# Patient Record
Sex: FEMALE | Race: WHITE | Employment: OTHER | URBAN - METROPOLITAN AREA
[De-identification: names, ages, dates, MRNs, and addresses within clinical notes are randomized per-mention and may not be internally consistent; named-entity substitution may affect disease eponyms.]

---

## 2017-07-07 ENCOUNTER — HOSPITAL ENCOUNTER (OUTPATIENT)
Dept: PHYSICAL THERAPY | Age: 82
Discharge: HOME | End: 2017-07-07
Payer: MEDICARE

## 2017-07-07 PROCEDURE — G8979 MOBILITY GOAL STATUS: HCPCS

## 2017-07-07 PROCEDURE — 97110 THERAPEUTIC EXERCISES: CPT

## 2017-07-07 PROCEDURE — 97162 PT EVAL MOD COMPLEX 30 MIN: CPT

## 2017-07-07 PROCEDURE — G8978 MOBILITY CURRENT STATUS: HCPCS

## 2017-07-07 NOTE — PROGRESS NOTES
[place] [address]  [facility] [place] [facility] AT [facility]  [address], [place]. [address], [place], [address] [facility] [address]   phone: [phone], fax: [phone]     PLAN OF [place]   Patient Name: Mis Kyle : 1922   Medical   Diagnosis: LBP, L hip pain Treatment Diagnosis: Low back pain [M54.5]  Left hip pain [M25.552]   Onset Date: ongoing     Referral Source: Litzy Crane MD Start of Care [facility]): 2017   Prior Hospitalization: See medical history Provider #: [id number]   Prior Level of Function: Ongoing pain with ADLs/ amb   Comorbidities: Fibromyalgia, osteoporosis, arthritis, HTN, scoliosis   Medications: Verified on Patient Summary List   The Plan of Care and following information is based on the information from the initial evaluation.   ======================================================================  Assessment / key information: Mis Kyle is a 80 y.o.  yo female with complaints of  L LBP and L hip pain which radiates down the left leg to the knee. Pain has been present for 10+ years. Pain is worse with prolonged sitting, prolonged standing/ walking, richard. when turning. The pain can be sharp in nature. On assessment, Mis Kyle ambulates into the clinic using a rollator for ambulation. She has limited standing tolerance due to increased back pain. She stands with flexed posture. S- shaped scoliosis is present with R ASIS elevated. She demonstrates Lx ROM as floows: flex= WFL, ext= significant limitation, . LE strength and ROM are WNLs except significant limitation of bilat hip IR and tight hip flexors. Repeated movement tests suggests directional preference for flex .    FOTO score= 34%.  ======================================================================  Eval Complexity: History HIGH Complexity :3+ comorbidities / personal factors will impact the outcome/ POC ;  Examination  MEDIUM Complexity : 3 Standardized tests and measures addressing body structure, function, activity limitation and / or participation in recreation ; Presentation MEDIUM Complexity : Evolving with changing characteristics ; Decision Making MEDIUM Complexity : FOTO score of 26-74; Overall Complexity MEDIUM  Problem List: pain affecting function, decrease ROM, decrease strength, impaired gait/ balance, decrease ADL/ functional abilitiies, decrease activity tolerance and decrease flexibility/ joint mobility   Treatment Plan may include any combination of the following: Therapeutic exercise, Therapeutic activities, Neuromuscular re-education, Physical agent/modality, Gait/balance training, Manual therapy and Patient education  Patient / Family readiness to learn indicated by: asking questions, trying to perform skills and interest  Persons(s) to be included in education: patient (P)  Barriers to Learning/Limitations: None  Measures taken:    Patient Goal (s): Walk without sudden pain   Patient self reported health status: good  Rehabilitation Potential: fair   Short Term Goals: To be accomplished in  2  weeks:  1 Patient will report >= 25% improvement in symptoms with ADLs. 2 Patient will be educated in appropriate ROM, stabilization, strengthening exercises; pt to report compliance. Felicia Salas Long Term Goals: To be accomplished in  4-6  weeks:  1 Patient to report >= 70% improvement in symptoms with ADLs. .  2 Patient will be independent with finalized HEP/ self maintenance. 3 Increase FOTO score >=  48% to indicate improved function. 4 Fully abolish radicular symptoms > 1 week duration. 5 Patient to report ability to stand > 5 minutes without increased pain    Frequency / Duration:   Patient to be seen  2  times per week for 4-6  weeks:  Patient / Caregiver education and instruction: self care, activity modification and exercises  G-Codes (GP): Mobility [id number] Current  CL= 60-79%   Goal  CJ= 20-39%.   The severity rating is based on the FOTO Score  Therapist Signature: Briseida Zhang PT Date: 3/5/4745   Certification Period: 7/7/17 to 10/3/17 Time: 12:48 PM   =======================================================================  I certify that the above Physical Therapy Services are being furnished while the patient is under my care. I agree with the treatment plan and certify that this therapy is necessary. Physician Signature:        Date:       Time:     Please sign and return to [facility] or you may fax the signed copy to [phone]. Thank you.

## 2017-07-07 NOTE — PROGRESS NOTES
PHYSICAL THERAPY - DAILY TREATMENT NOTE    Patient Name: Marya Duff        Date: 2017  : 1922    Patient  Verified: yes  Visit #:   1     Insurance: Payor: Dionna Reyes / Plan: [place] MEDICARE PART [place] & B / Product Type: Medicare /      In time: 930 Out time: 6947   Total Treatment Time: 55     Medicare Time Tracking (below)   Total Timed Codes (min):  15 1:1 Treatment Time:  55     TREATMENT AREA =  Low back pain [M54.5]  Left hip pain [M25.552]    SUBJECTIVE  Pain Level (on 0 to 10 scale):  5  / 10   Medication Changes/New allergies or changes in medical history, any new surgeries or procedures?     NO    If yes, update Summary List   Subjective Functional Status/Changes:  []  No changes reported     See EVAL/ POC         OBJECTIVE  Modalities Rationale: to decrease pain, edema, neural compromise in order to perform ADLs/ rest with improved ease        min [] Estim, type/location:                                      []  att     []  unatt     []  w/US     []  w/ice    []  w/heat    min []  Mechanical Traction: type/lbs                   []  pro   []  sup   []  int   []  cont    []  before manual    []  after manual    min []  Ultrasound, settings/location:      min []  Iontophoresis w/ dexamethasone, location:                                               []  take home patch       []  in clinic    min []  Ice     []  Heat    location/position:     min []  Vasopneumatic Device, press/temp:     min []  Other:    [] Skin assessment post-treatment (if applicable):    []  intact    []  redness- no adverse reaction     []redness  adverse reaction:        15 min Therapeutic Exercise:  [x]  See flow sheet   Rationale:      increase ROM and increase strength to improve the patients ability to perform ADLs      min Manual Therapy:    Rationale:      decrease pain, increase ROM, increase tissue extensibility and decrease trigger points to improve patient's ability to perform ADLs     min Neuromuscular Re-ed:    Rationale:    improve coordination, improve balance, increase proprioception and improved posture, improve motor control to improve the patients ability to perform ADLs     min Gait Training:    Rationale:       min Patient Education:  YES  Reviewed HEP   []  Progressed/Changed HEP based on:         Other Objective/Functional Measures:    See EVAL/ POC     Post Treatment Pain Level (on 0 to 10) scale:   5  / 10     ASSESSMENT      [x]  See POC     PLAN  [x]  Upgrade activities as tolerated  Continue plan of care: yes   []  Discharge due to :    []  Other:      Therapist: Troy Frazier PT    Date: 7/7/2017 Time: 12:47 PM     Future Appointments  Date Time Provider Felisa Samuel   7/12/2017 10:00 AM Troy Frazier, PT [facility]   7/19/2017 10:00 AM Troy Frazier, PT [facility]   7/21/2017 11:30 AM Troy Frazier, PT [facility]   7/26/2017 10:00 AM Troy Frazier, PT [facility]   7/28/2017 10:00 AM Troy Frazier, PT [facility]   8/2/2017 10:00 AM Troy Frazier, PT [facility]   8/4/2017 10:00 AM Troy Frazier, PT [facility]   8/9/2017 10:00 AM Troy Frazier, PT [facility]   8/16/2017 10:00 AM Troy Frazier, PT [facility]   8/18/2017 10:00 AM Troy Frazier, PT [facility]

## 2017-07-12 ENCOUNTER — HOSPITAL ENCOUNTER (OUTPATIENT)
Dept: PHYSICAL THERAPY | Age: 82
Discharge: HOME | End: 2017-07-12
Payer: MEDICARE

## 2017-07-12 PROCEDURE — 97110 THERAPEUTIC EXERCISES: CPT

## 2017-07-12 PROCEDURE — 97140 MANUAL THERAPY 1/> REGIONS: CPT

## 2017-07-19 ENCOUNTER — HOSPITAL ENCOUNTER (OUTPATIENT)
Dept: PHYSICAL THERAPY | Age: 82
Discharge: HOME | End: 2017-07-19
Payer: MEDICARE

## 2017-07-19 PROCEDURE — 97110 THERAPEUTIC EXERCISES: CPT

## 2017-07-19 PROCEDURE — 97140 MANUAL THERAPY 1/> REGIONS: CPT

## 2017-07-19 NOTE — PROGRESS NOTES
PHYSICAL THERAPY - DAILY TREATMENT NOTE    Patient Name: Zac Owens        Date: 2017  : 1922   YES Patient  Verified  Visit #:   3     Insurance: Payor: Sanjay Isabel / Plan: [place] MEDICARE PART A & B / Product Type: Medicare /      In time: 1000 Out time: 1542   Total Treatment Time: 45     Medicare Time Tracking (below)   Total Timed Codes (min):  35 1:1 Treatment Time:  35     TREATMENT AREA = Low back pain [M54.5]  Left hip pain [M25.552]    SUBJECTIVE  Pain Level (on 0 to 10 scale):  0  / 10   Medication Changes/New allergies or changes in medical history, any new surgeries or procedures?     NO    If yes, update Summary List   Subjective Functional Status/Changes:  []  No changes reported     Pain varies   Pain hits when I stand and go to turn     OBJECTIVE    20 min Therapeutic Exercise:  [x]  See flow sheet   Rationale:      increase ROM, increase strength and dec neural compromise to improve the patients ability to perform ADLs with less pain     15 min Manual Therapy: Technique:      [x] STM[]IASTM[x]TPR[]PROM[] Stretching  [] SOR[] man traction[]   []OP with REIL  []Jt manipulation []Gr I [] II []  III [] IV[] V[]    Treatment Area:  L QL, LB   Rationale:      decrease pain, increase ROM, increase tissue extensibility and decrease trigger points to improve patient's ability to perform ADLs with less pain      Modalities Rationale:     decrease pain and increase tissue extensibility to improve patient's ability to relax   min [] Estim, type/location:                                      []  att     []  unatt     []  w/US     []  w/ice    []  w/heat    min []  Mechanical Traction: type/lbs                   []  pro   []  sup   []  int   []  cont    []  before manual    []  after manual    min []  Ultrasound, settings/location:      min []  Iontophoresis w/ dexamethasone, location:                                               []  take home patch       []  in clinic   10 min [] Ice     [x]  Heat    location/position:     min []  Vasopneumatic Device, press/temp:     min []  Other:    [x] Skin assessment post-treatment (if applicable):    [x]  intact    [x]  redness- no adverse reaction     []redness  adverse reaction:         min Gait Training:    Rationale:        throughout Rx min Patient Education:  YES  Reviewed HEP   []  Progressed/Changed HEP based on: Other Objective/Functional Measures:    Tender L QL- glut region     Post Treatment Pain Level (on 0 to 10) scale:   4 / 10     ASSESSMENT  Assessment/Changes in Function:     Min change     []  See Progress Note/Recertification   Patient will continue to benefit from skilled PT services to modify and progress therapeutic interventions, address functional mobility deficits, address ROM deficits, address strength deficits, analyze and address soft tissue restrictions, analyze and cue movement patterns and assess and modify postural abnormalities to attain remaining goals.    Progress toward goals / Updated goals:    Progressed HEP     PLAN  [x]  Upgrade activities as tolerated YES Continue plan of care   []  Discharge due to :    []  Other:      Therapist: Swapna Sung PT    Date: 7/19/2017 Time: 10:12 AM     Future Appointments  Date Time Provider Felisa Samuel   7/21/2017 11:30 AM Swapna Sung PT [facility]   7/26/2017 10:00 AM Swapna Sung, PT [facility]   7/28/2017 10:00 AM Swapna Sung, PT [facility]   8/2/2017 10:00 AM Swapna Sung PT [facility]   8/4/2017 10:00 AM Swapna Sung PT [facility]   8/9/2017 10:00 AM Swapna Sung PT [facility]   8/16/2017 10:00 AM Swapna Sung, PT [facility]   8/18/2017 10:00 AM Swapna Sung, PT [facility]

## 2017-07-21 ENCOUNTER — HOSPITAL ENCOUNTER (OUTPATIENT)
Dept: PHYSICAL THERAPY | Age: 82
Discharge: HOME | End: 2017-07-21
Payer: MEDICARE

## 2017-07-21 PROCEDURE — 97140 MANUAL THERAPY 1/> REGIONS: CPT

## 2017-07-21 NOTE — PROGRESS NOTES
PHYSICAL THERAPY - DAILY TREATMENT NOTE    Patient Name: Emeka Andrews        Date: 2017  : 1922   YES Patient  Verified  Visit #:   4     Insurance: Payor: Charmaine Walter / Plan: [place] MEDICARE PART A & B / Product Type: Medicare /      In time: 569 Out time:    Total Treatment Time: 40     Medicare Time Tracking (below)   Total Timed Codes (min):  30 1:1 Treatment Time:  15     TREATMENT AREA = Low back pain [M54.5]  Left hip pain [M25.552]    SUBJECTIVE  Pain Level (on 0 to 10 scale):  5  / 10   Medication Changes/New allergies or changes in medical history, any new surgeries or procedures? NO    If yes, update Summary List   Subjective Functional Status/Changes:  []  No changes reported     Significant pain yesterday.   Did too much walking around to fix lunch        OBJECTIVE    15, nc min Therapeutic Exercise:  [x]  See flow sheet   Rationale:      increase ROM and inc stability to improve the patients ability to perform ADLs with less pain     15 min Manual Therapy: Technique:      [x] STM[]IASTM[x]TPR[]PROM[] Stretching  [] SOR[] man traction[]   []OP with REIL  []Jt manipulation []Gr I [] II []  III [] IV[] V[]    Treatment Area:  L LB/ glut region   Rationale:      decrease pain, increase ROM, increase tissue extensibility and decrease trigger points to improve patient's ability to perform ADLs with less pain      Modalities Rationale:     decrease pain and increase tissue extensibility to improve patient's ability to relax   min [] Estim, type/location:                                      []  att     []  unatt     []  w/US     []  w/ice    []  w/heat    min []  Mechanical Traction: type/lbs                   []  pro   []  sup   []  int   []  cont    []  before manual    []  after manual    min []  Ultrasound, settings/location:      min []  Iontophoresis w/ dexamethasone, location:                                               []  take home patch       []  in clinic   10 min []  Ice     [x]  Heat    location/position:     min []  Vasopneumatic Device, press/temp:     min []  Other:    [x] Skin assessment post-treatment (if applicable):    [x]  intact    []  redness- no adverse reaction     []redness  adverse reaction:         min Gait Training:    Rationale:        throughout Rx min Patient Education:  YES  Reviewed HEP   []  Progressed/Changed HEP based on: Other Objective/Functional Measures:    Tender L QL- glut. Increased tone     Post Treatment Pain Level (on 0 to 10) scale:   0  / 10     ASSESSMENT  Assessment/Changes in Function:     Min change reportsed     []  See Progress Note/Recertification   Patient will continue to benefit from skilled PT services to modify and progress therapeutic interventions, address functional mobility deficits, address ROM deficits, address strength deficits, analyze and address soft tissue restrictions, analyze and cue movement patterns and assess and modify postural abnormalities to attain remaining goals.    Progress toward goals / Updated goals:    Slow progress     PLAN  [x]  Upgrade activities as tolerated YES Continue plan of care   []  Discharge due to :    []  Other:      Therapist: Gabriela Dewitt PT    Date: 7/21/2017 Time: 9:56 AM     Future Appointments  Date Time Provider Felisa Samuel   7/21/2017 11:30 AM Gabriela Dewitt PT [facility]   7/26/2017 10:00 AM Gabriela Dewitt PT [address] [place] [address]   7/28/2017 10:00 AM Gabriela Dewitt PT [facility]   8/2/2017 10:00 AM Gabriela Dewitt PT [facility]   8/4/2017 10:00 AM Gabriela Dewitt PT [facility]   8/9/2017 10:00 AM Gabriela Dewitt PT [facility]   8/16/2017 10:00 AM Gabriela Dewitt PT [facility]   8/18/2017 10:00 AM Gabriela Dewitt PT [facility]

## 2017-07-26 ENCOUNTER — HOSPITAL ENCOUNTER (OUTPATIENT)
Dept: PHYSICAL THERAPY | Age: 82
Discharge: HOME | End: 2017-07-26
Payer: MEDICARE

## 2017-07-26 PROCEDURE — 97140 MANUAL THERAPY 1/> REGIONS: CPT

## 2017-07-26 PROCEDURE — 97110 THERAPEUTIC EXERCISES: CPT

## 2017-07-26 NOTE — PROGRESS NOTES
PHYSICAL THERAPY - DAILY TREATMENT NOTE    Patient Name: Ryan Patel        Date: 2017  : 1922   YES Patient  Verified  Visit #:     Insurance: Payor: Tony Rajput / Plan: [place] MEDICARE PART A & B / Product Type: Medicare /      In time: 6364 Out time:    Total Treatment Time: 40     Medicare Time Tracking (below)   Total Timed Codes (min):  30 1:1 Treatment Time:  30     TREATMENT AREA = Low back pain [M54.5]  Left hip pain [M25.552]    SUBJECTIVE  Pain Level (on 0 to 10 scale):  4  / 10   Medication Changes/New allergies or changes in medical history, any new surgeries or procedures? NO    If yes, update Summary List   Subjective Functional Status/Changes:  []  No changes reported     Woke in a lot of pain- better now.   Mornings are always bad    Overall 50% better        OBJECTIVE    15 min Therapeutic Exercise:  [x]  See flow sheet   Rationale:      increase ROM and increase strength to improve the patients ability to amb with less pain     15 min Manual Therapy: Technique:      [x] STM[]IASTM[x]TPR[]PROM[] Stretching  [] SOR[] man traction[]   []OP with REIL  []Jt manipulation []Gr I [] II []  III [] IV[] V[]    Treatment Area:  LB   Rationale:      decrease pain, increase tissue extensibility and decrease trigger points to improve patient's ability to amb with less pain      Modalities Rationale:     decrease pain and increase tissue extensibility to improve patient's ability to amb with less pain   min [] Estim, type/location:                                      []  att     []  unatt     []  w/US     []  w/ice    []  w/heat    min []  Mechanical Traction: type/lbs                   []  pro   []  sup   []  int   []  cont    []  before manual    []  after manual    min []  Ultrasound, settings/location:      min []  Iontophoresis w/ dexamethasone, location:                                               []  take home patch       []  in clinic   10 min []  Ice     [x]  Heat location/position:     min []  Vasopneumatic Device, press/temp:     min []  Other:    [x] Skin assessment post-treatment (if applicable):    [x]  intact    [x]  redness- no adverse reaction     []redness  adverse reaction:         min Gait Training:    Rationale:        throughout Rx min Patient Education:  YES  Reviewed HEP   []  Progressed/Changed HEP based on: Other Objective/Functional Measures    :improved bed mobility--less guarded         Post Treatment Pain Level (on 0 to 10) scale:   0  / 10     ASSESSMENT  Assessment/Changes in Function:     Functional improvement:  Improved katrina, mobility with bed transfers   Functional limitation:  pain      []  See Progress Note/Recertification   Patient will continue to benefit from skilled PT services to modify and progress therapeutic interventions, address functional mobility deficits, address ROM deficits, address strength deficits, analyze and address soft tissue restrictions, analyze and cue movement patterns and assess and modify postural abnormalities to attain remaining goals.    Progress toward goals / Updated goals:    Met LTG 1     PLAN  [x]  Upgrade activities as tolerated YES Continue plan of care   []  Discharge due to :    []  Other:      Therapist: Olga Conklin PT    Date: 7/26/2017 Time: 10:09 AM     Future Appointments  Date Time Provider Felisa Samuel   7/28/2017 10:00 AM Olga Conklin PT [facility]   8/2/2017 10:00 AM Olga Conklin PT [facility]   8/4/2017 10:00 AM Olga Conklin PT [facility]   8/9/2017 10:00 AM Olga Conklin PT [facility]   8/16/2017 10:00 AM Olga Conklin PT [facility]   8/18/2017 10:00 AM Olga Conklin PT [facility]

## 2017-07-28 ENCOUNTER — HOSPITAL ENCOUNTER (OUTPATIENT)
Dept: PHYSICAL THERAPY | Age: 82
Discharge: HOME | End: 2017-07-28
Payer: MEDICARE

## 2017-07-28 PROCEDURE — 97110 THERAPEUTIC EXERCISES: CPT

## 2017-07-28 PROCEDURE — 97140 MANUAL THERAPY 1/> REGIONS: CPT

## 2017-07-28 NOTE — PROGRESS NOTES
PHYSICAL THERAPY - DAILY TREATMENT NOTE    Patient Name: Erica Finn        Date: 2017  : 1922   YES Patient  Verified  Visit #:     Insurance: Payor: Jacob Art / Plan: [place] MEDICARE PART A & B / Product Type: Medicare /      In time:  Out time:    Total Treatment Time: 45     Medicare Time Tracking (below)   Total Timed Codes (min):  30 1:1 Treatment Time:  30     TREATMENT AREA = Low back pain [M54.5]  Left hip pain [M25.552]    SUBJECTIVE  Pain Level (on 0 to 10 scale):  4  / 10   Medication Changes/New allergies or changes in medical history, any new surgeries or procedures? NO    If yes, update Summary List   Subjective Functional Status/Changes:  []  No changes reported     Better, but pain is still there.   Less pain with walking   Managing pain without Tylenol     OBJECTIVE    15 min Therapeutic Exercise:  [x]  See flow sheet   Rationale:      increase ROM and increase strength to improve the patients ability to amb with less pain     15 min Manual Therapy: Technique:      [x] STM[]IASTM[x]TPR[]PROM[] Stretching  [] SOR[] man traction[]   []OP with REIL  []Jt manipulation []Gr I [] II []  III [] IV[] V[]    Treatment Area:  LB   Rationale:      decrease pain, increase ROM, increase tissue extensibility and decrease trigger points to improve patient's ability to amb/ perform ADLs with less pain        Modalities Rationale:     decrease pain and increase tissue extensibility to improve patient's ability to perform ADLs/ amb with less pain   min [] Estim, type/location:                                      []  att     []  unatt     []  w/US     []  w/ice    []  w/heat    min []  Mechanical Traction: type/lbs                   []  pro   []  sup   []  int   []  cont    []  before manual    []  after manual    min []  Ultrasound, settings/location:      min []  Iontophoresis w/ dexamethasone, location:                                               []  take home patch []  in clinic   10 min []  Ice     [x]  Heat    location/position:     min []  Vasopneumatic Device, press/temp:     min []  Other:    [x] Skin assessment post-treatment (if applicable):    [x]  intact    [x]  redness- no adverse reaction     []redness  adverse reaction:         min Gait Training:    Rationale:        throughout Rx min Patient Education:  YES  Reviewed HEP   []  Progressed/Changed HEP based on: Other Objective/Functional Measures:    Improved mobility with amb and with transitions on treatment table     Post Treatment Pain Level (on 0 to 10) scale:   0  / 10     ASSESSMENT  Assessment/Changes in Function:     Functional improvement:  Improved mobility with transfers   Functional limitation:  Cont pain with amb      []  See Progress Note/Recertification   Patient will continue to benefit from skilled PT services to modify and progress therapeutic interventions, address functional mobility deficits, address ROM deficits, address strength deficits, analyze and address soft tissue restrictions and analyze and cue movement patterns to attain remaining goals.    Progress toward goals / Updated goals:    Dec pain level as reported by pt      reported PLAN  [x]  Upgrade activities as tolerated YES Continue plan of care   []  Discharge due to :    []  Other:      Therapist: Gabriela Dewitt PT    Date: 7/28/2017 Time: 10:08 AM     Future Appointments  Date Time Provider Felisa Samuel   8/2/2017 10:00 AM Gabriela Dewitt PT [facility]   8/4/2017 10:00 AM Gabriela Dewitt PT [facility]   8/9/2017 10:00 AM Gabriela Dewitt PT [facility]   8/16/2017 10:00 AM Gabriela Dewitt PT [facility]   8/18/2017 10:00 AM Gabriela Dewitt PT [facility]

## 2017-08-02 ENCOUNTER — HOSPITAL ENCOUNTER (OUTPATIENT)
Dept: PHYSICAL THERAPY | Age: 82
Discharge: HOME | End: 2017-08-02
Payer: MEDICARE

## 2017-08-02 PROCEDURE — 97110 THERAPEUTIC EXERCISES: CPT

## 2017-08-02 PROCEDURE — 97140 MANUAL THERAPY 1/> REGIONS: CPT

## 2017-08-02 NOTE — PROGRESS NOTES
PHYSICAL THERAPY - DAILY TREATMENT NOTE    Patient Name: Ryan Patel        Date: 2017  : 1922   YES Patient  Verified  Visit #:     Insurance: Payor: Tony Rajput / Plan: [place] MEDICARE PART A & B / Product Type: Medicare /      In time: 1000 Out time:    Total Treatment Time: 45     Medicare Time Tracking (below)   Total Timed Codes (min): 35 1:1 Treatment Time:  35     TREATMENT AREA = Low back pain [M54.5]  Left hip pain [M25.552]    SUBJECTIVE  Pain Level (on 0 to 10 scale):  0  / 10   Medication Changes/New allergies or changes in medical history, any new surgeries or procedures?     NO    If yes, update Summary List   Subjective Functional Status/Changes:  []  No changes reported     Not much pain after I get up and moving, But sign pain getting up on the morning        OBJECTIVE    20 min Therapeutic Exercise:  [x]  See flow sheet   Rationale:      increase ROM and increase strength to improve the patients ability to amb and transition with less pain     15 min Manual Therapy: Technique:      [x] STM[]IASTM[x]TPR[]PROM[x] Stretching  [] SOR[] man traction[]   []OP with REIL  []Jt manipulation []Gr I [] II []  III [] IV[] V[]    Treatment Area:  LB   Rationale:      decrease pain, increase ROM, increase tissue extensibility and decrease trigger points to improve patient's ability to amb/ transition with less pain        Modalities Rationale:     decrease pain and increase tissue extensibility to improve patient's ability to relax muscles   min [] Estim, type/location:                                      []  att     []  unatt     []  w/US     []  w/ice    []  w/heat    min []  Mechanical Traction: type/lbs                   []  pro   []  sup   []  int   []  cont    []  before manual    []  after manual    min []  Ultrasound, settings/location:      min []  Iontophoresis w/ dexamethasone, location:                                               []  take home patch       []  in clinic   10 min []  Ice     [x]  Heat    location/position:     min []  Vasopneumatic Device, press/temp:     min []  Other:    [x] Skin assessment post-treatment (if applicable):    [x]  intact    [x]  redness- no adverse reaction     []redness  adverse reaction:          throughout Rx min Patient Education:  YES  Reviewed HEP   []  Progressed/Changed HEP based on: Other Objective/Functional Measures:    Improved mobility with ambulation--less guarded     Post Treatment Pain Level (on 0 to 10) scale:   0  / 10     ASSESSMENT  Assessment/Changes in Function:     Functional improvement:  Improved amb   Functional limitation:  Pain getting up in the morning      []  See Progress Note/Recertification   Patient will continue to benefit from skilled PT services to modify and progress therapeutic interventions, address functional mobility deficits, address ROM deficits, address strength deficits, analyze and address soft tissue restrictions and assess and modify postural abnormalities to attain remaining goals.    Progress toward goals / Updated goals:    Decreased pain intensity     PLAN  [x]  Upgrade activities as tolerated YES Continue plan of care   []  Discharge due to :    []  Other:      Therapist: Velma Martinez PT    Date: 8/2/2017 Time: 10:02 AM     Future Appointments  Date Time Provider Felisa Samuel   8/4/2017 10:00 AM Velma Martinez PT [facility]   8/9/2017 10:00 AM Velma Martinez PT [facility]   8/16/2017 10:00 AM Velma Martinez PT [facility]   8/18/2017 10:00 AM Velma Martinez, PT [facility]

## 2017-08-04 ENCOUNTER — APPOINTMENT (OUTPATIENT)
Dept: PHYSICAL THERAPY | Age: 82
End: 2017-08-04
Payer: MEDICARE

## 2017-08-09 ENCOUNTER — APPOINTMENT (OUTPATIENT)
Dept: PHYSICAL THERAPY | Age: 82
End: 2017-08-09
Payer: MEDICARE

## 2017-08-16 ENCOUNTER — HOSPITAL ENCOUNTER (OUTPATIENT)
Dept: PHYSICAL THERAPY | Age: 82
Discharge: HOME | End: 2017-08-16
Payer: MEDICARE

## 2017-08-16 PROCEDURE — 97110 THERAPEUTIC EXERCISES: CPT

## 2017-08-16 PROCEDURE — 97140 MANUAL THERAPY 1/> REGIONS: CPT

## 2017-08-16 NOTE — PROGRESS NOTES
PHYSICAL THERAPY - DAILY TREATMENT NOTE    Patient Name: Manav Lubin        Date: 2017  : 1922   YES Patient  Verified  Visit #:     nsurance: Payor: [place] MEDICARE / Plan: [place] MEDICARE PART A & B / Product Type: Medicare /      In time: 693 Out time:    Total Treatment Time: 30     Medicare Time Tracking (below)   Total Timed Codes (min):  30 1:1 Treatment Time:  30     TREATMENT AREA = Low back pain [M54.5]  Left hip pain [M25.552]    SUBJECTIVE  Pain Level (on 0 to 10 scale):  0  / 10   Medication Changes/New allergies or changes in medical history, any new surgeries or procedures? NO    If yes, update Summary List   Subjective Functional Status/Changes:  []  No changes reported     No pain now, but took meds last night. Had MELODY, but fell coming out of doctors office        OBJECTIVE    15 min Therapeutic Exercise:  [x]  See flow sheet   Rationale:      increase ROM, increase strength and improve posture to improve the patients ability to move with less pain     15 min Manual Therapy: Technique:      [x] STM[]IASTM[x]TPR[]PROM[] Stretching  [] SOR[] man traction[]   []OP with REIL  []Jt manipulation []Gr I [] II []  III [] IV[] V[]    Treatment Area:  LB   Rationale:      decrease pain, increase ROM, increase tissue extensibility and decrease trigger points to improve patient's ability to move with less pain            throughout Rx min Patient Education:  YES  Reviewed HEP   []  Progressed/Changed HEP based on:            Other Objective/Functional Measures:    amb with more upright posture     Post Treatment Pain Level (on 0 to 10) scale:   0  / 10     ASSESSMENT  Assessment/Changes in Function:     Functional improvement: intermittent pain   Functional limitation:  At risk for falls     []  See Progress Note/Recertification   Patient will continue to benefit from skilled PT services to modify and progress therapeutic interventions, address functional mobility deficits, address ROM deficits, address strength deficits, analyze and address soft tissue restrictions, analyze and cue movement patterns and assess and modify postural abnormalities to attain remaining goals.    Progress toward goals / Updated goals:    Slow progress--progressing to LTG 1     PLAN  [x]  Upgrade activities as tolerated YES Continue plan of care   []  Discharge due to :    []  Other:      Therapist: Ginny Bustillo PT    Date: 8/16/2017 Time: 10:18 AM     Future Appointments  Date Time Provider Felisa Samuel   8/18/2017 10:00 AM Ginny Bustillo PT [facility]

## 2017-08-18 ENCOUNTER — HOSPITAL ENCOUNTER (OUTPATIENT)
Dept: PHYSICAL THERAPY | Age: 82
Discharge: HOME | End: 2017-08-18
Payer: MEDICARE

## 2017-08-18 PROCEDURE — G8979 MOBILITY GOAL STATUS: HCPCS

## 2017-08-18 PROCEDURE — 97110 THERAPEUTIC EXERCISES: CPT

## 2017-08-18 PROCEDURE — G8978 MOBILITY CURRENT STATUS: HCPCS

## 2017-08-18 PROCEDURE — 97140 MANUAL THERAPY 1/> REGIONS: CPT

## 2017-08-18 NOTE — PROGRESS NOTES
[place] [address] [facility] AT [facility]  [address], [address]   phone: [phone], fax: [phone]  CONTINUED PLAN OF CARE/RECERTIFICATION FOR PHYSICAL THERAPY          Patient Name: Sarah Nagel : 1922   Treatment/Medical Diagnosis: Low back pain [M54.5]  Left hip pain [M25.552]   Onset Date: ongoing    Referral Source: Altagracia Ovalle MD Start of Care [facility]): 17   Prior Hospitalization: See Medical History Provider #: [id number]   Prior Level of Function: Ongoing pain with ADLs/ amb   Comorbidities: Fibromyalgia, osteoporosis, arthritis, HTN, scoliosis   Medications: Verified on Patient Summary List   [facility]: 9 Missed Visits: 0     Goal/Measure of Progress Goal Met? 1. Patient to report >= 70% improvement in symptoms with ADLs. .   Status at last Eval: 0 Current Status: 50% progressing   2. Patient will be independent with finalized HEP/ self maintenance   Status at last Eval: na Current Status: progressing progressing   3. Increase FOTO score >=  48% to indicate improved function   Status at last Eval: 34% Current Status: 33% no     4. Fully abolish radicular symptoms > 1 week duration   Status at last Eval: Intermittent to the knee Current Status: Intermittent to the knee no   5. Patient to report ability to stand > 5 minutes without increased pain   Status at last Eval: <5 min Current Status: 10-15 min yes     Key Functional Changes/Progress: Patient reports 50% improvement in symptoms. She states she is able to walk with less pain and for longer distance.   She reports improved standing tolerance  Problem List: pain affecting function, decrease ROM, decrease strength, impaired gait/ balance, decrease ADL/ functional abilitiies, decrease activity tolerance and decrease flexibility/ joint mobility   Treatment Plan may include any combination of the following: Therapeutic exercise, Therapeutic activities, Neuromuscular re-education, Physical agent/modality, Gait/balance training, Manual therapy and Patient education  Patient Goal(s) has been updated and includes:      Goals for this certification period include and are to be achieved in   4  weeks:  Continue LTGs as stated above  Frequency / Duration:   Patient to be seen   1   times per week for   2-4    weeks:  G-Codes (GP): Mobility [id number] Current  CL= 60-79%   Goal  CJ= 20-39%. The severity rating is based on the FOTO Score  Assessments/Recommendations: cont PT per current POC  If you have any questions/comments please contact us directly at [phone]. Thank you for allowing us to assist in the care of your patient. Therapist Signature: Mark Moran PT Date: 3/21/6458   Certification Period:  Reporting Period: 7/7/17 to 10/3/17  7/7/17 to 8/18/17 Time: 12:33 PM   NOTE TO PHYSICIAN:  PLEASE COMPLETE THE ORDERS BELOW AND FAX TO   [facility]: [phone]. If you are unable to process this request in 24 hours please contact our office: [phone].    ___ I have read the above report and request that my patient continue as recommended.   ___ I have read the above report and request that my patient continue therapy with the following changes/special instructions: ________________________________________________   ___ I have read the above report and request that my patient be discharged from therapy.      Physician Signature:        Date:

## 2017-08-18 NOTE — PROGRESS NOTES
PHYSICAL THERAPY - DAILY TREATMENT NOTE    Patient Name: Obi Dorman        Date: 2017  : 1922   YES Patient  Verified  Visit #:     Insurance: Payor: Mahogany Blair / Plan: [place] MEDICARE PART A & B / Product Type: Medicare /      In time: 420 Out time: 9792   Total Treatment Time: 40     Medicare Time Tracking (below)   Total Timed Codes (min):  40 1:1 Treatment Time:  40     TREATMENT AREA = Low back pain [M54.5]  Left hip pain [M25.552]    SUBJECTIVE  Pain Level (on 0 to 10 scale):  5-6  / 10   Medication Changes/New allergies or changes in medical history, any new surgeries or procedures? NO    If yes, update Summary List   Subjective Functional Status/Changes:  []  No changes reported     Overall-- 50% better        OBJECTIVE    25 min Therapeutic Exercise:  [x]  See flow sheet   Rationale:      increase ROM and increase strength to improve the patients ability to amb with less pain     15 min Manual Therapy: Technique:      [x] STM[]IASTM[x]TPR[]PROM[x] Stretching  [] SOR[] man traction[]   []OP with REIL  []Jt manipulation []Gr I [] II []  III [] IV[] V[]    Treatment Area:  LB/ hips   Rationale:      decrease pain, increase ROM, increase tissue extensibility and decrease trigger points to improve patient's ability to move/ amb with improved fluidity        Modalities Rationale:     Patient deferred       min Gait Training:    Rationale:        throughout Rx min Patient Education:  YES  Reviewed HEP   []  Progressed/Changed HEP based on:            Other Objective/Functional Measures:    50% improvement reported  FOTO= 33   Post Treatment Pain Level (on 0 to 10) scale:   1-2  / 10     ASSESSMENT  Assessment/Changes in Function:     Functional improvement:  Less pain with ambulation, moving in bed   Functional limitation:  Standing/ walking duration limited to 10-15 minutes      [x]  See Progress Note/Recertification     PLAN  [x]  Upgrade activities as tolerated YES Continue plan of care   []  Discharge due to :    []  Other:      Therapist: Ginny Bustillo PT    Date: 8/18/2017 Time: 9:55 AM     Future Appointments  Date Time Provider Felisa Samuel   8/18/2017 10:00 AM Ginny Bustillo PT [facility]

## 2017-08-30 ENCOUNTER — HOSPITAL ENCOUNTER (OUTPATIENT)
Dept: PHYSICAL THERAPY | Age: 82
Discharge: HOME | End: 2017-08-30
Payer: MEDICARE

## 2017-08-30 PROCEDURE — 97140 MANUAL THERAPY 1/> REGIONS: CPT

## 2017-08-30 PROCEDURE — 97110 THERAPEUTIC EXERCISES: CPT

## 2017-08-30 NOTE — PROGRESS NOTES
PHYSICAL THERAPY - DAILY TREATMENT NOTE    Patient Name: Kim Slaas        Date: 2017  : 1922   YES Patient  Verified  Visit #:   10     Insurance: Payor: Chris Boland / Plan: [place] MEDICARE PART A & B / Product Type: Medicare /      In time:  Out time:    Total Treatment Time: 50     Medicare Time Tracking (below)   Total Timed Codes (min):  45 1:1 Treatment Time:  45     TREATMENT AREA = Low back pain [M54.5]  Left hip pain [M25.552]    SUBJECTIVE  Pain Level (on 0 to 10 scale):  3  / 10   Medication Changes/New allergies or changes in medical history, any new surgeries or procedures? NO    If yes, update Summary List   Subjective Functional Status/Changes:  []  No changes reported     Doing pretty good today.   Concerned about having another MELODY--prefers PT       OBJECTIVE    30 min Therapeutic Exercise:  [x]  See flow sheet   Rationale:      increase ROM and increase strength to improve the patients ability to perform ADLs/ amb with less pain     15 min Manual Therapy: Technique:      [x] STM[]IASTM[x]TPR[]PROM[] Stretching  [] SOR[] man traction[]   []OP with REIL  []Jt manipulation []Gr I [] II []  III [] IV[] V[]    Treatment Area:  LB   Rationale:      decrease pain, increase ROM, increase tissue extensibility and decrease trigger points to improve patient's ability to perform ADLs/ amb and rest with less pain        Modalities Rationale:     decrease inflammation, decrease pain and increase tissue extensibility to improve patient's ability to amb/ move with less pain/ guarding   min [] Estim, type/location:                                      []  att     []  unatt     []  w/US     []  w/ice    []  w/heat    min []  Mechanical Traction: type/lbs                   []  pro   []  sup   []  int   []  cont    []  before manual    []  after manual    min []  Ultrasound, settings/location:      min []  Iontophoresis w/ dexamethasone, location: []  take home patch       []  in clinic   10 min []  Ice     []  Heat    location/position:     min []  Vasopneumatic Device, press/temp:     min []  Other:    [x] Skin assessment post-treatment (if applicable):    [x]  intact    [x]  redness- no adverse reaction     []redness  adverse reaction:         min Gait Training:    Rationale:        throughout Rx min Patient Education:  YES  Reviewed HEP   []  Progressed/Changed HEP based on: Other Objective/Functional Measures:    Dec overall pain intensity  Tender l piriformis     Post Treatment Pain Level (on 0 to 10) scale:   0  / 10     ASSESSMENT  Assessment/Changes in Function:     Functional improvement:  amb with improved mechanics--staying closer to RW. Improved katrina   Functional limitation:  Pain with ADLs      []  See Progress Note/Recertification   Patient will continue to benefit from skilled PT services to modify and progress therapeutic interventions, address functional mobility deficits, address ROM deficits, address strength deficits, analyze and address soft tissue restrictions, analyze and cue movement patterns and instruct in home and community integration to attain remaining goals.    Progress toward goals / Updated goals:    Slow progress but overall improvement in pain level     PLAN  [x]  Upgrade activities as tolerated YES Continue plan of care   []  Discharge due to :    []  Other:      Therapist: Juancarlos Varghese PT    Date: 8/30/2017 Time: 11:24 AM     Future Appointments  Date Time Provider Felisa Samuel   8/30/2017 11:30 AM Juancarlos Varghese PT [address]

## 2017-09-08 ENCOUNTER — HOSPITAL ENCOUNTER (OUTPATIENT)
Dept: PHYSICAL THERAPY | Age: 82
Discharge: HOME | End: 2017-09-08
Payer: MEDICARE

## 2017-09-08 PROCEDURE — 97110 THERAPEUTIC EXERCISES: CPT

## 2017-09-08 PROCEDURE — 97140 MANUAL THERAPY 1/> REGIONS: CPT

## 2017-09-08 NOTE — PROGRESS NOTES
PHYSICAL THERAPY - DAILY TREATMENT NOTE    Patient Name: Angelina Solares        Date: 2017  : 1922   YES Patient  Verified  Visit #:     Insurance: Payor: Velton Fend / Plan: [place] MEDICARE PART A & B / Product Type: Medicare /      In time: 193 Out time: 1300   Total Treatment Time: 45     Medicare Time Tracking (below)   Total Timed Codes (min):  35 1:1 Treatment Time:  25     TREATMENT AREA = Low back pain [M54.5]  Left hip pain [M25.552]    SUBJECTIVE  Pain Level (on 0 to 10 scale):  0  / 10   Medication Changes/New allergies or changes in medical history, any new surgeries or procedures?     NO    If yes, update Summary List   Subjective Functional Status/Changes:  []  No changes reported     Feel good at the present time, but pain getting out of bed this morning        OBJECTIVE    25, 15 1:1 min Therapeutic Exercise:  [x]  See flow sheet   Rationale:      increase ROM and increase strength to improve the patients ability to amb/ transition with less pain     10 min Manual Therapy: Technique:      [x] STM[]IASTM[x]TPR[]PROM[] Stretching  [] SOR[] man traction[]   []OP with REIL  []Jt manipulation []Gr I [] II []  III [] IV[] V[]    Treatment Area:L LB- glut     Rationale:      decrease pain, increase ROM, increase tissue extensibility, decrease trigger points and increase postural awareness to improve patient's ability to amb/ transition with less pain        Modalities Rationale:     decrease pain and increase tissue extensibility to improve patient's ability to move/ transition with less pain   min [] Estim, type/location:                                      []  att     []  unatt     []  w/US     []  w/ice    []  w/heat    min []  Mechanical Traction: type/lbs                   []  pro   []  sup   []  int   []  cont    []  before manual    []  after manual    min []  Ultrasound, settings/location:      min []  Iontophoresis w/ dexamethasone, location: []  take home patch       []  in clinic   10 min []  Ice     [x]  Heat    location/position:     min []  Vasopneumatic Device, press/temp:     min []  Other:    [x] Skin assessment post-treatment (if applicable):    [x]  intact    [x]  redness- no adverse reaction     []redness  adverse reaction:             throughout Rx min Patient Education:  YES  Reviewed HEP   []  Progressed/Changed HEP based on: Other Objective/Functional Measures:      Reports doing ex's before going to bed each night   Post Treatment Pain Level (on 0 to 10) scale:   0  / 10     ASSESSMENT  Assessment/Changes in Function:     Functional improvement:  Walking more   Functional limitation:  Persistent back pain      []  See Progress Note/Recertification   Patient will continue to benefit from skilled PT services to modify and progress therapeutic interventions, address functional mobility deficits, address ROM deficits, address strength deficits, analyze and address soft tissue restrictions, analyze and cue movement patterns and assess and modify postural abnormalities to attain remaining goals.    Progress toward goals / Updated goals:         PLAN  []  Upgrade activities as tolerated YES Continue plan of care   []  Discharge due to :    []  Other:      Therapist: Ryan Erazo PT    Date: 9/8/2017 Time: 10:00 AM     Future Appointments  Date Time Provider Felisa Samuel   9/13/2017 11:30 AM Ryan Erazo PT [facility]

## 2017-09-13 ENCOUNTER — HOSPITAL ENCOUNTER (OUTPATIENT)
Dept: PHYSICAL THERAPY | Age: 82
Discharge: HOME | End: 2017-09-13
Payer: MEDICARE

## 2017-09-13 PROCEDURE — G8980 MOBILITY D/C STATUS: HCPCS

## 2017-09-13 PROCEDURE — G8979 MOBILITY GOAL STATUS: HCPCS

## 2017-09-13 PROCEDURE — 97140 MANUAL THERAPY 1/> REGIONS: CPT

## 2017-09-13 PROCEDURE — 97110 THERAPEUTIC EXERCISES: CPT

## 2017-09-13 NOTE — PROGRESS NOTES
PHYSICAL THERAPY - DAILY TREATMENT NOTE    Patient Name: Elza Chi        Date: 2017  : 1922   YES Patient  Verified  Visit #:     Insurance: Payor: Raven Long / Plan: [place] MEDICARE PART A & B / Product Type: Medicare /      In time: 9294 Out time: 4847   Total Treatment Time: 50     Medicare Time Tracking (below)   Total Timed Codes (min):  40 1:1 Treatment Time:  40     TREATMENT AREA = Low back pain [M54.5]  Left hip pain [M25.552]    SUBJECTIVE  Pain Level (on 0 to 10 scale):  7  / 10   Medication Changes/New allergies or changes in medical history, any new surgeries or procedures? NO    If yes, update Summary List   Subjective Functional Status/Changes:  []  No changes reported     Pain has been terrible lately. I was supposed to get a MELODY- but I haven't done that. Get pain with walking about 5-6 minutes. Pain L LB to the knee.           OBJECTIVE    25 min Therapeutic Exercise:  [x]  See flow sheet   Rationale:      increase ROM, increase strength and inc stability, dec neural compromise to improve the patients ability to amb with less pain     15 min Manual Therapy: Technique:      [x] STM[]IASTM[x]TPR[]PROM[] Stretching  [] SOR[] man traction[]   []OP with REIL  []Jt manipulation []Gr I [] II []  III [] IV[] V[]    Treatment Area:  LB- gluts   Rationale:      decrease pain, increase ROM, increase tissue extensibility and decrease trigger points to improve patient's ability to amb with less pain        Modalities Rationale:     decrease pain and increase tissue extensibility to improve patient's ability to relax   min [] Estim, type/location:                                      []  att     []  unatt     []  w/US     []  w/ice    []  w/heat    min []  Mechanical Traction: type/lbs                   []  pro   []  sup   []  int   []  cont    []  before manual    []  after manual    min []  Ultrasound, settings/location:      min []  Iontophoresis w/ dexamethasone, location:                                               []  take home patch       []  in clinic   10 min []  Ice     [x]  Heat    location/position:     min []  Vasopneumatic Device, press/temp:     min []  Other:    [x] Skin assessment post-treatment (if applicable):    [x]  intact    [x]  redness- no adverse reaction     []redness  adverse reaction:         min Gait Training:    Rationale:        throughout Rx min Patient Education:  YES  Reviewed HEP   []  Progressed/Changed HEP based on: Other Objective/Functional Measures:    foto=47     Post Treatment Pain Level (on 0 to 10) scale:   3  / 10     ASSESSMENT  Assessment/Changes in Function:        []  See Progress Note/Recertification   Patient will continue to benefit from skilled PT services to modify and progress therapeutic interventions, address functional mobility deficits, address ROM deficits, address strength deficits, analyze and address soft tissue restrictions, analyze and cue movement patterns and assess and modify postural abnormalities to attain remaining goals. Progress toward goals / Updated goals:  Min progress in recent weeks     PLAN  []  Upgrade activities as tolerated YES Continue plan of care   [x]  Discharge due to : Lack of recent progress   []  Other:      Therapist: Malvin Lora PT    Date: 9/13/2017 Time: 11:31 AM     No future appointments.

## 2017-10-16 NOTE — PROGRESS NOTES
[address] PHYSICAL THERAPY AT [address] [facility] [address] [place] [address], [address] [place] [address], [address], [address] [place] [address]  Phone: [phone]  Fax: [phone] FOR PHYSICAL THERAPY          Patient Name: Alba La : 1922   Treatment/Medical Diagnosis: Low back pain [M54.5]  Left hip pain [M25.552]   Onset Date: ongoing     Referral Source: Chauncey Benitez MD [facility]): 17   Prior Hospitalization: See Medical History Provider #: [id number]   Prior Level of Function: Ongoing pain with ADLs/ amb   Comorbidities: Fibromyalgia, osteoporosis, arthritis, HTN, scoliosis   Medications: Verified on Patient Summary List   Visits from [facility]: 12 Missed Visits: 0               Goal/Measure of Progress Goal Met? 1. Patient to report >= 70% improvement in symptoms with ADLs. .   Status at last Eval: 0 Current Status: 50% no   2. Patient will be independent with finalized HEP/ self maintenance   Status at last Eval: na Current Status: Written instructions provided yes   3. Increase FOTO score >=  48% to indicate improved function   Status at last Eval: 33% Current Status: 47% no              4.  Fully abolish radicular symptoms > 1 week duration   Status at last Eval: Intermittent to the knee Current Status: Intermittent to the knee no   5. Patient to report ability to stand > 5 minutes without increased pain   Status at last Eval: <5 min Current Status: 10-15 min yes      Key Functional Changes/Progress: Patient reports 50% improvement in symptom, but reports that symptom intensity variess. She states she is able to walk with less pain and for longer distance. She reports improved standing tolerance    G-Codes (GP): Mobility  [id number] Goal  CJ= 20-39%  D/C  CK= 40-59%. The severity rating is based on the FOTO Score      Assessments/Recommendations:  Other: Patient has reached plateau in progress  If you have any questions/comments please contact us directly at ([address]) [phone]. Thank you for allowing us to assist in the care of your patient.     Therapist Signature: Lee Martino PT Date: 10/16/17   Reporting Period: 8/18/17 to 9/13/17 Time: 8:15 AM